# Patient Record
Sex: MALE | Race: WHITE | NOT HISPANIC OR LATINO | URBAN - METROPOLITAN AREA
[De-identification: names, ages, dates, MRNs, and addresses within clinical notes are randomized per-mention and may not be internally consistent; named-entity substitution may affect disease eponyms.]

---

## 2017-01-13 ENCOUNTER — EMERGENCY (EMERGENCY)
Facility: HOSPITAL | Age: 42
LOS: 1 days | Discharge: HOME | End: 2017-01-13
Attending: EMERGENCY MEDICINE | Admitting: EMERGENCY MEDICINE
Payer: COMMERCIAL

## 2017-01-13 VITALS
HEART RATE: 66 BPM | DIASTOLIC BLOOD PRESSURE: 72 MMHG | SYSTOLIC BLOOD PRESSURE: 114 MMHG | TEMPERATURE: 98 F | OXYGEN SATURATION: 99 % | RESPIRATION RATE: 18 BRPM

## 2017-01-13 VITALS
OXYGEN SATURATION: 97 % | HEART RATE: 72 BPM | SYSTOLIC BLOOD PRESSURE: 122 MMHG | DIASTOLIC BLOOD PRESSURE: 70 MMHG | RESPIRATION RATE: 16 BRPM

## 2017-01-13 DIAGNOSIS — R07.2 PRECORDIAL PAIN: ICD-10-CM

## 2017-01-13 DIAGNOSIS — R10.13 EPIGASTRIC PAIN: ICD-10-CM

## 2017-01-13 LAB
ALBUMIN SERPL ELPH-MCNC: 4.4 G/DL — SIGNIFICANT CHANGE UP (ref 3.3–5)
ALP SERPL-CCNC: 82 U/L — SIGNIFICANT CHANGE UP (ref 40–120)
ALT FLD-CCNC: 27 U/L RC — SIGNIFICANT CHANGE UP (ref 10–45)
ANION GAP SERPL CALC-SCNC: 15 MMOL/L — SIGNIFICANT CHANGE UP (ref 5–17)
APTT BLD: 30.8 SEC — SIGNIFICANT CHANGE UP (ref 27.5–37.4)
AST SERPL-CCNC: 31 U/L — SIGNIFICANT CHANGE UP (ref 10–40)
BASOPHILS # BLD AUTO: 0 K/UL — SIGNIFICANT CHANGE UP (ref 0–0.2)
BASOPHILS NFR BLD AUTO: 0.9 % — SIGNIFICANT CHANGE UP (ref 0–2)
BILIRUB SERPL-MCNC: 0.2 MG/DL — SIGNIFICANT CHANGE UP (ref 0.2–1.2)
BUN SERPL-MCNC: 14 MG/DL — SIGNIFICANT CHANGE UP (ref 7–23)
CALCIUM SERPL-MCNC: 9.3 MG/DL — SIGNIFICANT CHANGE UP (ref 8.4–10.5)
CHLORIDE SERPL-SCNC: 104 MMOL/L — SIGNIFICANT CHANGE UP (ref 96–108)
CO2 SERPL-SCNC: 22 MMOL/L — SIGNIFICANT CHANGE UP (ref 22–31)
CREAT SERPL-MCNC: 1.08 MG/DL — SIGNIFICANT CHANGE UP (ref 0.5–1.3)
EOSINOPHIL # BLD AUTO: 0.1 K/UL — SIGNIFICANT CHANGE UP (ref 0–0.5)
EOSINOPHIL NFR BLD AUTO: 2.3 % — SIGNIFICANT CHANGE UP (ref 0–6)
GLUCOSE SERPL-MCNC: 115 MG/DL — HIGH (ref 70–99)
HCT VFR BLD CALC: 41.6 % — SIGNIFICANT CHANGE UP (ref 39–50)
HGB BLD-MCNC: 15.3 G/DL — SIGNIFICANT CHANGE UP (ref 13–17)
INR BLD: 0.96 RATIO — SIGNIFICANT CHANGE UP (ref 0.88–1.16)
LYMPHOCYTES # BLD AUTO: 1.7 K/UL — SIGNIFICANT CHANGE UP (ref 1–3.3)
LYMPHOCYTES # BLD AUTO: 28.8 % — SIGNIFICANT CHANGE UP (ref 13–44)
MCHC RBC-ENTMCNC: 31.4 PG — SIGNIFICANT CHANGE UP (ref 27–34)
MCHC RBC-ENTMCNC: 36.7 GM/DL — HIGH (ref 32–36)
MCV RBC AUTO: 85.7 FL — SIGNIFICANT CHANGE UP (ref 80–100)
MONOCYTES # BLD AUTO: 0.4 K/UL — SIGNIFICANT CHANGE UP (ref 0–0.9)
MONOCYTES NFR BLD AUTO: 7 % — SIGNIFICANT CHANGE UP (ref 2–14)
NEUTROPHILS # BLD AUTO: 3.5 K/UL — SIGNIFICANT CHANGE UP (ref 1.8–7.4)
NEUTROPHILS NFR BLD AUTO: 61.1 % — SIGNIFICANT CHANGE UP (ref 43–77)
PLATELET # BLD AUTO: 177 K/UL — SIGNIFICANT CHANGE UP (ref 150–400)
POTASSIUM SERPL-MCNC: 4.3 MMOL/L — SIGNIFICANT CHANGE UP (ref 3.5–5.3)
POTASSIUM SERPL-SCNC: 4.3 MMOL/L — SIGNIFICANT CHANGE UP (ref 3.5–5.3)
PROT SERPL-MCNC: 6.7 G/DL — SIGNIFICANT CHANGE UP (ref 6–8.3)
PROTHROM AB SERPL-ACNC: 10.4 SEC — SIGNIFICANT CHANGE UP (ref 10–13.1)
RBC # BLD: 4.85 M/UL — SIGNIFICANT CHANGE UP (ref 4.2–5.8)
RBC # FLD: 12.4 % — SIGNIFICANT CHANGE UP (ref 10.3–14.5)
SODIUM SERPL-SCNC: 141 MMOL/L — SIGNIFICANT CHANGE UP (ref 135–145)
TROPONIN T SERPL-MCNC: <0.01 NG/ML — SIGNIFICANT CHANGE UP (ref 0–0.06)
TROPONIN T SERPL-MCNC: <0.01 NG/ML — SIGNIFICANT CHANGE UP (ref 0–0.06)
WBC # BLD: 5.8 K/UL — SIGNIFICANT CHANGE UP (ref 3.8–10.5)
WBC # FLD AUTO: 5.8 K/UL — SIGNIFICANT CHANGE UP (ref 3.8–10.5)

## 2017-01-13 PROCEDURE — 83690 ASSAY OF LIPASE: CPT

## 2017-01-13 PROCEDURE — 93010 ELECTROCARDIOGRAM REPORT: CPT | Mod: 77

## 2017-01-13 PROCEDURE — 71020: CPT | Mod: 26

## 2017-01-13 PROCEDURE — 93005 ELECTROCARDIOGRAM TRACING: CPT | Mod: 76

## 2017-01-13 PROCEDURE — 96375 TX/PRO/DX INJ NEW DRUG ADDON: CPT

## 2017-01-13 PROCEDURE — G0378: CPT

## 2017-01-13 PROCEDURE — 96374 THER/PROPH/DIAG INJ IV PUSH: CPT

## 2017-01-13 PROCEDURE — 71046 X-RAY EXAM CHEST 2 VIEWS: CPT

## 2017-01-13 PROCEDURE — 99284 EMERGENCY DEPT VISIT MOD MDM: CPT | Mod: 25

## 2017-01-13 PROCEDURE — 85610 PROTHROMBIN TIME: CPT

## 2017-01-13 PROCEDURE — 99220: CPT | Mod: 25

## 2017-01-13 PROCEDURE — 85027 COMPLETE CBC AUTOMATED: CPT

## 2017-01-13 PROCEDURE — 80053 COMPREHEN METABOLIC PANEL: CPT

## 2017-01-13 PROCEDURE — 85730 THROMBOPLASTIN TIME PARTIAL: CPT

## 2017-01-13 PROCEDURE — 93010 ELECTROCARDIOGRAM REPORT: CPT

## 2017-01-13 PROCEDURE — 84484 ASSAY OF TROPONIN QUANT: CPT

## 2017-01-13 RX ORDER — METOCLOPRAMIDE HCL 10 MG
10 TABLET ORAL ONCE
Qty: 0 | Refills: 0 | Status: COMPLETED | OUTPATIENT
Start: 2017-01-13 | End: 2017-01-13

## 2017-01-13 RX ORDER — METOCLOPRAMIDE HCL 10 MG
1 TABLET ORAL
Qty: 9 | Refills: 0
Start: 2017-01-13 | End: 2017-01-16

## 2017-01-13 RX ORDER — LIDOCAINE 4 G/100G
10 CREAM TOPICAL ONCE
Qty: 0 | Refills: 0 | Status: COMPLETED | OUTPATIENT
Start: 2017-01-13 | End: 2017-01-13

## 2017-01-13 RX ORDER — SODIUM CHLORIDE 9 MG/ML
3 INJECTION INTRAMUSCULAR; INTRAVENOUS; SUBCUTANEOUS EVERY 12 HOURS
Qty: 0 | Refills: 0 | Status: DISCONTINUED | OUTPATIENT
Start: 2017-01-13 | End: 2017-01-17

## 2017-01-13 RX ORDER — FAMOTIDINE 10 MG/ML
20 INJECTION INTRAVENOUS ONCE
Qty: 0 | Refills: 0 | Status: COMPLETED | OUTPATIENT
Start: 2017-01-13 | End: 2017-01-13

## 2017-01-13 RX ORDER — ASPIRIN/CALCIUM CARB/MAGNESIUM 324 MG
162 TABLET ORAL DAILY
Qty: 0 | Refills: 0 | Status: DISCONTINUED | OUTPATIENT
Start: 2017-01-13 | End: 2017-01-17

## 2017-01-13 RX ADMIN — SODIUM CHLORIDE 3 MILLILITER(S): 9 INJECTION INTRAMUSCULAR; INTRAVENOUS; SUBCUTANEOUS at 17:35

## 2017-01-13 RX ADMIN — Medication 162 MILLIGRAM(S): at 13:58

## 2017-01-13 RX ADMIN — Medication 30 MILLILITER(S): at 15:42

## 2017-01-13 RX ADMIN — Medication 10 MILLIGRAM(S): at 16:02

## 2017-01-13 RX ADMIN — LIDOCAINE 10 MILLILITER(S): 4 CREAM TOPICAL at 15:42

## 2017-01-13 RX ADMIN — FAMOTIDINE 20 MILLIGRAM(S): 10 INJECTION INTRAVENOUS at 15:42

## 2017-01-13 NOTE — ED ADULT NURSE NOTE - OBJECTIVE STATEMENT
41 yr old male, a+ox3, presents via ambulance from doctors office for chest pressure, pain, and persistent hiccups, and increased belching.  Reports hiccups started yesterday.  Presents with stable vital signs, and states that chest pressure started last night.  Denies radiation, reports sternal pressure.  Denies nausea/vomiting and diarrhea s/p starting his GERD medications.  Denies shortness of breath, or dyspnea, fevers, chills. Denies abd pain or distention.  Lungs cta unlabored.  skin w/d/i.  Presents with 20g IVL to left AC.

## 2017-01-13 NOTE — ED CDU PROVIDER NOTE - MEDICAL DECISION MAKING DETAILS
Molly: 41 year old male with 1 day history of hiccups, cp with radiation to left arm. HEART score of= 0.  will c/w tele monitoring, serial cardiac enzymes, reassess.

## 2017-01-13 NOTE — ED PROVIDER NOTE - MEDICAL DECISION MAKING DETAILS
40 yo male with chest pain, low risk HEART score of 1; will obtain basic labs ekg troponin x 2 chest xray --> re eval

## 2017-01-13 NOTE — ED PROVIDER NOTE - OBJECTIVE STATEMENT
42 yo male with gerd presenting with 1 day hx of hiccups and chest pain.  chest pain described as sharp tearing pain located substernal/epigastric with radiation to lateral left chest.  started this morning and lasted about two hours.  went to his primary care doctor where he had episode of pressure like chest pain which felt like there was a rock on his chest with no radiation.  no shortness of breath or diaphoresis.  ekg done by ems was unremarkable.  nothing makes the pain better or worse.  described as 9/10 in severity.  no fevers, chills, n/v/d.  also has been having hiccups since last night which has resolved prior to presentation in the ER.    PCP- Chilo Motta

## 2017-01-13 NOTE — ED CDU PROVIDER NOTE - PLAN OF CARE
1. You may take Motrin 600mg every 6 hours as needed for pain.  2. Follow up with your Primary Care Physician as soon as possible for further evaluation.   3. Return to the Emergency Department for any concerning symptoms. 1. You may take Motrin 600mg every 6 hours as needed for pain. For recurrent hiccups you may take Reglan 10mg every 8 hours as needed.   2. Follow up with your Primary Care Physician and the [facility] as soon as possible for further evaluation (phone number provided).   3. Return to the Emergency Department for any concerning symptoms. 1. You may take Motrin 600mg every 6 hours as needed for pain. For recurrent hiccups you may take Reglan 10mg every 8 hours as needed and Pepcid 20mg every 12 hours as needed.    2. Follow up with your Primary Care Physician and the [facility] as soon as possible for further evaluation (phone number provided).   3. Return to the Emergency Department for any concerning symptoms.

## 2017-01-13 NOTE — ED CDU PROVIDER NOTE - DETAILS
Chest Pain  -Frequent reevaluations  -Tele monitoring  -repeat troponin w/ EKG  -Case d/w Dr. Barnes

## 2017-01-13 NOTE — ED CDU PROVIDER NOTE - ATTENDING CONTRIBUTION TO CARE
I have personally performed a face to face diagnostic evaluation on this patient.  I have reviewed the ACP note and agree with the history, exam, and plan of care, except as noted.  History and Exam by me shows

## 2017-01-13 NOTE — ED PROVIDER NOTE - ATTENDING CONTRIBUTION TO CARE
I have seen and evaluated this patient with the resident.   I agree with the findings  unless other wise stated.  After my face to face bedside evaluation, I am [age] year old male with chest pressure x 1 day. PE: att exam: patient awake alert NAD . LUNGS CTAB no wheeze no crackle. CARD RRR no m/r/g.  Abdomen soft NT ND no rebound no guarding no CVA tenderness. EXT WWP no edema no calf tenderness CV 2+DP/PT bilaterally. neuro A&Ox3 gait normal.  skin warm and dry no rash

## 2017-01-13 NOTE — ED ADULT NURSE REASSESSMENT NOTE - NS ED NURSE REASSESS COMMENT FT1
Pt d/c home per ISIDRO Ohara, pt VSS no complaints no distress, IV lock removed, ambulated out of unit with spouse, left hospital via private car.
report to CDU Rn
Pt. started having  intense hiccups. ISIDRO Ohara notified with orders to administer GI cocktail. Orders carried out.
Received pt. from ED nurse REVA García. VSS. Placed on tele as ordered. NSR on tele monitor. No acute distress noted. No complaints voiced. Pt. oriented to CDU. IV access patent. Awaiting 2nd trop @1807. Educated about fall precautions. Safety maintained. Needs attended to. Will continue to monitor.

## 2017-01-13 NOTE — ED CDU PROVIDER NOTE - OBJECTIVE STATEMENT
40 yo male with gerd presenting with 1 day hx of hiccups and chest pain.  chest pain described as sharp tearing pain located substernal/epigastric with radiation to lateral left chest.  started this morning and lasted about two hours.  went to his primary care doctor where he had episode of pressure like chest pain which felt like there was a rock on his chest with no radiation.  no shortness of breath or diaphoresis.  ekg done by ems was unremarkable.  nothing makes the pain better or worse.  described as 9/10 in severity.  no fevers, chills, n/v/d.  also has been having hiccups since last night which has resolved prior to presentation in the ER.    PCP- Chilo Motta

## 2017-01-13 NOTE — ED CDU PROVIDER NOTE - PROGRESS NOTE DETAILS
Patient resting in bed, NAD, VSS, reporting sudden onset hiccuping with significant associated pain with each hiccup, no pain in between. He does report a significant history of GI problems since the age of 9, has had several upper endoscopies in the past. Will give GI cocktail and Reglan and reassess. Lev Marquis PA-C. Patient resting comfortably in bed, NAD, VSS, no longer hiccuping. Will continue to observe and repeat bloodwork at 1800. Lev Marquis PA-C. Arron: I Richard Heller MD have personally performed a face to face diagnostic evaluation on this patient.  I have reviewed the ACP note and agree with the history, exam, and plan of care, except as noted.   My medical decision making and observations are found below.  Patient with persistent hiccups.  Long hx of GI issues. Last EGD 2 years ago.  GI cocktail and reglan stopped hiccups will need GI referral eventually as outpatient. I have personally performed a face to face diagnostic evaluation on this patient.  I have reviewed the ACP note and agree with the history, exam, and plan of care, except as noted.  History and Exam by me shows  Attn - hiccups resolved pt without pain.  Trop neg x 2  hx of reflux  P/E - nad, skin-, lungs-, cor-, abdo-, Pt advised to F/U with GI - Pt stable for D/C.

## 2017-01-13 NOTE — ED PROVIDER NOTE - ENMT, MLM
Airway patent, Nasal mucosa clear. Mouth with normal mucosa. Throat has no vesicles, no oropharyngeal exudates and uvula is midline. Airway patent. Mouth with normal mucosa. Throat has no vesicles, no oropharyngeal exudates and uvula is midline.

## 2017-01-22 ENCOUNTER — EMERGENCY (EMERGENCY)
Facility: HOSPITAL | Age: 42
LOS: 1 days | Discharge: HOME | End: 2017-01-22
Admitting: EMERGENCY MEDICINE

## 2017-01-22 VITALS
OXYGEN SATURATION: 96 % | DIASTOLIC BLOOD PRESSURE: 56 MMHG | RESPIRATION RATE: 16 BRPM | WEIGHT: 184.97 LBS | HEIGHT: 65 IN | SYSTOLIC BLOOD PRESSURE: 130 MMHG | HEART RATE: 71 BPM | TEMPERATURE: 98 F

## 2017-01-22 DIAGNOSIS — R07.81 PLEURODYNIA: ICD-10-CM

## 2017-03-16 ENCOUNTER — HOSPITAL ENCOUNTER (EMERGENCY)
Dept: HOSPITAL 74 - JERFT | Age: 42
Discharge: HOME | End: 2017-03-16
Payer: COMMERCIAL

## 2017-03-16 VITALS — SYSTOLIC BLOOD PRESSURE: 124 MMHG | TEMPERATURE: 98 F | DIASTOLIC BLOOD PRESSURE: 97 MMHG | HEART RATE: 68 BPM

## 2017-03-16 VITALS — BODY MASS INDEX: 29 KG/M2
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
## 2017-03-16 DIAGNOSIS — S61.011A: Primary | ICD-10-CM
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
## 2017-03-16 PROCEDURE — 0HQFXZZ REPAIR RIGHT HAND SKIN, EXTERNAL APPROACH: ICD-10-PCS

## 2017-03-16 NOTE — PDOC
[id number]


RT THUMB LACERATION


Time Seen by Provider: 03/16/17 13:24


History Source: Patient


Exam Limitations: No Limitations





- History of Present Illness


Initial Comments: 


03/16/17 14:26








Chief complaint right thumb laceration








History of present illness: Patient is a 41-year-old male here today with a 

laceration to his right thumb that he sustained on a razor while working in his 

home.  Patient has full range of motion of right thumb at MCP and PIP and DIP 

joint. Pt. denies any numbness of right thumb or hand. She was up-to-date with 

tetanus.





03/16/17 14:28





Occurred: reports: just prior to arrival


Severity: reports: mild


Pain Location: reports: upper extremity (rt. thumb laceration)


Method of Injury: Yes: other (on a razor)


Modifying Factors: improves with: None


Loss of Consciousness: no loss of consciousness


Associated Symptoms (Fall): denies symptoms





Past History





- Past Medical History


Allergies/Adverse Reactions: 


 Allergies











Allergy/AdvReac Type Severity Reaction Status Date / Time


 


No Known Allergies Allergy   Verified 03/16/17 12:38











Home Medications: 


Ambulatory Orders





Cephalexin Monohydrate [Keflex -] 500 mg PO Q8H #20 capsule 03/16/17 








Other medical history: denies





- Psycho/Social/Smoking Cessation Hx


Suicidal Ideation: No


Smoking History: Never smoked


Information on smoking cessation initiated: No


Hx Alcohol Use: No


Drug/Substance Use Hx: No


Substance Use Type: Marijuana





**Review of Systems





- Review of Systems


Able to Perform ROS?: Yes


Constitutional: No: Symptoms Reported


HEENTM: No: Symptoms Reported


Respiratory: No: Symptoms reported


Cardiac (ROS): No: Symptoms Reported


ABD/GI: No: Symptoms Reported


: No: Symptoms Reported


Musculoskeletal: No: Symptoms Reported


Integumentary: Yes: Other (rt. dorsal thumb laceration proximal phalange)


Neurological: No: Symptoms reported





*Physical Exam





- Vital Signs


 Last Vital Signs











Temp Pulse Resp BP Pulse Ox


 


 98 F   68   18   124/97   98 


 


 03/16/17 12:35  03/16/17 12:35  03/16/17 12:35  03/16/17 12:35  03/16/17 12:35














- Physical Exam


General Appearance: Yes: Appropriately Dressed


Comments:: 





03/16/17 14:01


right radial pulse 4 + 


Extremity: positive: Normal Capillary Refill, Normal Range of Motion (rt. thumb 

at MCP jt, PIP JT, DIP jt. ), Tender (rt. thumb )


Integumentary: positive: Other (laceration rt. dorsal proximal thumb approx 2.6 

cm x 0.25 cm  linear superficial )


Neurologic: positive: Normal Response (rt. thumb ), Respond to painful stimul (

rt. thumb ).  negative: Sensory Deficit





Procedures





- Consent


Consent obtained: From Patient





- Laceration/Wound Repair


  ** Right Dorsal 1st digit Finger


Wound Length: 2.6 to 5.0 cm


Wound Explored: clean


Wound's Depth, Shape: superficial, linear


Irrigated w/ Saline: Yes


Betadine Prep: Yes


Anesthesia: 1% Lidocaine


Amount of Anesthetic (ccs): 2


Wound Repaired With: Sutures


Suture Size/Type: 4:0


Number of Sutures: 5 (interrupted)


Sterile Dressing Applied: Yes


Splint Applied: No


Sling Applied: No


Progress: 





03/16/17 14:09








Cleanse with Betadine and then Irrigated thoroughly with normal saline 0.9 % no 

foreign body noted, no tendon involvement patient has full range of motion of 

her right MCP joint, PIP joint and DIP joint. 





Medical Decision Making





- Medical Decision Making





03/16/17 14:28


Patient is a 41-year-old male here today with a laceration to his right thumb 

that he sustained on a razor while working in his home.  Patient has full range 

of motion of right thumb at MCP and PIP and DIP joint. Pt. denies any numbness 

of right thumb or hand. She was up-to-date with tetanus.











Right thumb laceration 











PLAN: 








Five  interrupted sutures placed and right dorsal thumb laceration





Dry sterile dressing applied


Keflex 500 mg by mouth given and then 1 tab every 8 hours 7 days


Patient lives in [facility] will have sutures removed there in 10-

14 days





*DC/Admit/Observation/Transfer


Diagnosis at time of Disposition: 


Laceration of thumb


Qualifiers:


 Encounter type: initial encounter Laterality: right Qualified Code(s): 

S61.011A - Laceration without foreign body of right thumb without damage to nail

, initial encounter





- Discharge Dispostion


Disposition: HOME


Condition at time of disposition: Stable





- Prescriptions


Prescriptions: 


Cephalexin Monohydrate [Keflex -] 500 mg PO Q8H #20 capsule





- Patient Instructions


Additional Instructions: 














May wash wound twice daily with antibacterial soap and water pat dry well and 

apply a tiny amount of bacitracin cover with dressing went out of home let air 

out at night











Follow up forced suture removal in 10-14 days or sooner if any redness around 

wound or discharge from wound














Patient voiced understanding of discharge instructions and all questions were 

answered

## 2017-09-14 ENCOUNTER — HOSPITAL ENCOUNTER (EMERGENCY)
Dept: HOSPITAL 74 - JERFT | Age: 42
Discharge: HOME | End: 2017-09-14
Payer: COMMERCIAL

## 2017-09-14 VITALS — TEMPERATURE: 98.2 F | SYSTOLIC BLOOD PRESSURE: 120 MMHG | DIASTOLIC BLOOD PRESSURE: 78 MMHG | HEART RATE: 65 BPM

## 2017-09-14 VITALS — BODY MASS INDEX: 30.9 KG/M2

## 2017-09-14 DIAGNOSIS — S46.812A: Primary | ICD-10-CM
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
## 2017-09-14 NOTE — PDOC
History of Present Illness





- General


Chief Complaint: Pain, Acute


Stated Complaint: LT SHOULDER/ ARM PAIN


Time Seen by Provider: 09/14/17 11:58


History Source: Patient


Exam Limitations: No Limitations





- History of Present Illness


Initial Comments: 





09/14/17 12:14


42 yr male with c/o pain to left shoulder after fall off mower machine yesterday

, pt was mowing the lawn on a stand up mower when he hit a pothole and fell. pt 

denies head trauma no LOC. 


Occurred: reports: yesterday


Severity: reports: moderate


Upper Extremity Pain Location: left: shoulder (pt right hand dominant )


Method of Injury: reports: fell


Modifying Factors: improves with: None





Past History





- Past Medical History


Allergies/Adverse Reactions: 


 Allergies











Allergy/AdvReac Type Severity Reaction Status Date / Time


 


No Known Allergies Allergy   Verified 09/14/17 11:04











Home Medications: 


Ambulatory Orders





Naproxen [Naprosyn -] 500 mg PO BID PRN #20 tablet 09/14/17 








GI Disorders: Yes (GERD)


Psychiatric Problems: Yes (ANXIETY)





- Surgical History


Neurologic Surgery: Yes ("SPINAL CYST REMOVAL")





- Immunization History


Immunization Up to Date: Yes





- Psycho/Social/Smoking Cessation Hx


Suicidal Ideation: No


Smoking History: Never smoked


Have you smoked in the past 12 months: No


Information on smoking cessation initiated: No


Hx Alcohol Use: Yes (ALCOHOL)


Drug/Substance Use Hx: No


Substance Use Type: Alcohol, Marijuana





*Physical Exam





- Vital Signs


 Last Vital Signs











Temp Pulse Resp BP Pulse Ox


 


 98.2 F   65   16   120/78   98 


 


 09/14/17 11:04  09/14/17 11:04  09/14/17 11:04  09/14/17 11:04  09/14/17 11:04














- Physical Exam


General Appearance: Yes: Nourished, Appropriately Dressed


HEENT: positive: EOMI, KHADRA, TMs Normal, Pharynx Normal


Neck: positive: Supple.  negative: Tender


Respiratory/Chest: positive: Lungs Clear, Normal Breath Sounds


Cardiovascular: positive: Regular Rhythm, Regular Rate


Musculoskeletal: positive: Normal Inspection


Extremity: positive: Normal Capillary Refill, Normal Inspection, Normal Range 

of Motion, Tender (anterior and posterior shoulder left side , nv intact 

limited ROM due to pain )


Integumentary: positive: Normal Color, Dry, Warm


Neurologic: positive: CNs II-XII NML intact, Fully Oriented, Alert, Normal Mood/

Affect, Normal Response, Motor Strength 5/5





ED Treatment Course





- RADIOLOGY


Radiology Studies Ordered: 














 Category Date Time Status


 


 SHOULDER-LEFT [RAD] Stat Radiology  09/14/17 12:10 Ordered














Medical Decision Making





- Medical Decision Making


09/14/17 12:15


cc: fell yesterday off  injured his left shoulder


no head trauma 


will get xray to r/o fracture


pt took 800motrin PTA





09/14/17 12:59


sling placed to left arm 


dc home with follow up with orthopedist tomorrow in [place] (pt has apt) 





09/14/17 13:03








*DC/Admit/Observation/Transfer


Diagnosis at time of Disposition: 


Left shoulder strain


Qualifiers:


 Encounter type: initial encounter Qualified Code(s): S46.912A - Strain of 

unspecified muscle, fascia and tendon at shoulder and upper arm level, left arm

, initial encounter





- Discharge Dispostion


Disposition: HOME


Condition at time of disposition: Improved





- Prescriptions


Prescriptions: 


Naproxen [Naprosyn -] 500 mg PO BID PRN #20 tablet


 PRN Reason: Pain





- Referrals


Referrals: 


STAFF,NOT ON [Primary Care Provider] - 


Cem Cabrera MD [Staff Physician] - 





- Patient Instructions


Additional Instructions: 


use sling while awake remove to sleep, drive and bathe


take naprosyn for pain every 12hrd as directed





follow with your orthopedist tomorrow as scheduled or with  in [place]

## 2019-05-15 PROBLEM — K21.9 GASTRO-ESOPHAGEAL REFLUX DISEASE WITHOUT ESOPHAGITIS: Chronic | Status: ACTIVE | Noted: 2017-01-13

## 2019-05-24 ENCOUNTER — APPOINTMENT (OUTPATIENT)
Dept: CARDIOLOGY | Facility: CLINIC | Age: 44
End: 2019-05-24

## 2021-05-10 NOTE — ED ADULT NURSE NOTE - NS ED NOTE ABUSE RESPONSE YN
Wt Readings from Last 3 Encounters:   05/10/21 95 7 kg (211 lb)   03/11/21 99 3 kg (219 lb)   03/09/21 100 kg (221 lb)      he follows up with Cardiology Dr Mayela Bae   he is currently taking 10 mg of torsemide every day   he has increased it to 20 after his recent ER visit in [place] however that caused an increase in the of his creatinine he had a follow-up appointment with Cardiology that lower the dose back to 10   he is advised to take an extra tablet of 10 mg if he notices an increase of 5 lb of weight or excessive bilateral lower extremity edema no

## 2022-02-23 ENCOUNTER — RESULT REVIEW (OUTPATIENT)
Age: 47
End: 2022-02-23

## 2022-02-23 ENCOUNTER — OUTPATIENT (OUTPATIENT)
Dept: OUTPATIENT SERVICES | Facility: HOSPITAL | Age: 47
LOS: 1 days | End: 2022-02-23
Payer: COMMERCIAL

## 2022-02-23 VITALS
RESPIRATION RATE: 17 BRPM | SYSTOLIC BLOOD PRESSURE: 112 MMHG | DIASTOLIC BLOOD PRESSURE: 67 MMHG | OXYGEN SATURATION: 95 % | HEART RATE: 86 BPM

## 2022-02-23 VITALS
HEIGHT: 66 IN | DIASTOLIC BLOOD PRESSURE: 90 MMHG | OXYGEN SATURATION: 97 % | WEIGHT: 197.98 LBS | HEART RATE: 72 BPM | TEMPERATURE: 97 F | SYSTOLIC BLOOD PRESSURE: 114 MMHG | RESPIRATION RATE: 18 BRPM

## 2022-02-23 DIAGNOSIS — Z98.890 OTHER SPECIFIED POSTPROCEDURAL STATES: Chronic | ICD-10-CM

## 2022-02-23 DIAGNOSIS — R91.8 OTHER NONSPECIFIC ABNORMAL FINDING OF LUNG FIELD: ICD-10-CM

## 2022-02-23 PROCEDURE — 88312 SPECIAL STAINS GROUP 1: CPT

## 2022-02-23 PROCEDURE — 31624 DX BRONCHOSCOPE/LAVAGE: CPT | Mod: RT,XS

## 2022-02-23 PROCEDURE — 71045 X-RAY EXAM CHEST 1 VIEW: CPT | Mod: 26

## 2022-02-23 PROCEDURE — 31628 BRONCHOSCOPY/LUNG BX EACH: CPT | Mod: RT

## 2022-02-23 PROCEDURE — 88173 CYTOPATH EVAL FNA REPORT: CPT

## 2022-02-23 PROCEDURE — 88172 CYTP DX EVAL FNA 1ST EA SITE: CPT

## 2022-02-23 PROCEDURE — 88305 TISSUE EXAM BY PATHOLOGIST: CPT | Mod: 26

## 2022-02-23 PROCEDURE — 76000 FLUOROSCOPY <1 HR PHYS/QHP: CPT

## 2022-02-23 PROCEDURE — 88312 SPECIAL STAINS GROUP 1: CPT | Mod: 26,59

## 2022-02-23 PROCEDURE — 88312 SPECIAL STAINS GROUP 1: CPT | Mod: 26

## 2022-02-23 PROCEDURE — 88305 TISSUE EXAM BY PATHOLOGIST: CPT

## 2022-02-23 PROCEDURE — 71045 X-RAY EXAM CHEST 1 VIEW: CPT

## 2022-02-23 PROCEDURE — 88173 CYTOPATH EVAL FNA REPORT: CPT | Mod: 26

## 2022-02-23 PROCEDURE — 31654 BRONCH EBUS IVNTJ PERPH LES: CPT | Mod: RT,XS

## 2022-02-23 DEVICE — PACK THORACENTESIS CHG: Type: IMPLANTABLE DEVICE | Status: FUNCTIONAL

## 2022-02-23 RX ORDER — SODIUM CHLORIDE 9 MG/ML
1000 INJECTION, SOLUTION INTRAVENOUS
Refills: 0 | Status: DISCONTINUED | OUTPATIENT
Start: 2022-02-23 | End: 2022-03-09

## 2022-02-23 RX ORDER — IBUPROFEN 200 MG
1 TABLET ORAL
Qty: 0 | Refills: 0 | DISCHARGE

## 2022-02-23 RX ORDER — ALPRAZOLAM 0.25 MG
1 TABLET ORAL
Qty: 0 | Refills: 0 | DISCHARGE

## 2022-02-23 RX ORDER — ROSUVASTATIN CALCIUM 5 MG/1
1 TABLET ORAL
Qty: 0 | Refills: 0 | DISCHARGE

## 2022-02-23 RX ORDER — PREDNISOLONE 5 MG
5 TABLET ORAL
Qty: 0 | Refills: 0 | DISCHARGE

## 2022-02-23 NOTE — ASU PATIENT PROFILE, ADULT - FALL HARM RISK - UNIVERSAL INTERVENTIONS
Bed in lowest position, wheels locked, appropriate side rails in place/Call bell, personal items and telephone in reach/Instruct patient to call for assistance before getting out of bed or chair/Non-slip footwear when patient is out of bed/[place] to call system/Physically safe environment - no spills, clutter or unnecessary equipment/Purposeful Proactive Rounding/Room/bathroom lighting operational, light cord in reach

## 2022-02-23 NOTE — PRE-ANESTHESIA EVALUATION ADULT - NSANTHOSAYNRD_GEN_A_CORE
No. MATT screening performed.  STOP BANG Legend: 0-2 = LOW Risk; 3-4 = INTERMEDIATE Risk; 5-8 = HIGH Risk

## 2022-02-23 NOTE — ASU PREOP CHECKLIST - SITE MARKED BY ANESTHESIOLOGIST
Assessment charted. Pt in bed resting after going to bathroom,  appears short
of breath and weak from effort. States he did not rest well last night,
frequent disruptions and headaches. Pain is 6/10 to abscess sites, scheduled
and PRN meds given. INT to LH. Lungs are coarse in bases and wheezy expirtory.
 Will conitnue to monitor. n/a

## 2022-02-23 NOTE — PRE-ANESTHESIA EVALUATION ADULT - NSANTHPMHFT_GEN_ALL_CORE
45 yo M w/ PMHx of HLD, GERD presents for bronchoscopy and EBUS 47 yo M w/ PMHx of HLD, GERD w/ pulmonary nodules presents for bronchoscopy and EBUS to r/o sarcoidosis.    Denies active CP/SOB/Orthopnea  Denies hx of MI/CHF

## 2022-02-23 NOTE — ASU PATIENT PROFILE, ADULT - NSICDXPASTMEDICALHX_GEN_ALL_CORE_FT
PAST MEDICAL HISTORY:  Gastroesophageal reflux disease, esophagitis presence not specified     Hyperlipidemia

## 2022-02-23 NOTE — ASU DISCHARGE PLAN (ADULT/PEDIATRIC) - CARE PROVIDER_API CALL
Jerrod Ford (DO)  Critical Care Medicine; Internal Medicine; Pulmonary Disease  [address] [place] [address], [facility] [address]  [place] [address]  Phone: [phone]  Fax: [phone]  Follow Up Time:

## 2022-02-23 NOTE — ASU DISCHARGE PLAN (ADULT/PEDIATRIC) - NS MD DC FALL RISK RISK
For information on Fall & Injury Prevention, visit: [email or web address] OR  [email or web address] OR  https://www.cdc.gov/steadi/patient.html

## 2022-02-24 LAB — NON-GYNECOLOGICAL CYTOLOGY STUDY: SIGNIFICANT CHANGE UP

## 2022-02-25 LAB — SURGICAL PATHOLOGY STUDY: SIGNIFICANT CHANGE UP

## 2022-09-03 NOTE — ED ADULT TRIAGE NOTE - BSA (M2)
1.91

## 2024-09-24 ENCOUNTER — NON-APPOINTMENT (OUTPATIENT)
Age: 49
End: 2024-09-24

## (undated) DEVICE — FILTERLINE NASAL O2 CO2 ADLT

## (undated) DEVICE — DRSG CURITY GAUZE SPONGE 4 X 4" 12-PLY

## (undated) DEVICE — BRUSH CYTO ENDO

## (undated) DEVICE — Device

## (undated) DEVICE — FOLEY HOLDER STATLOCK 2 WAY ADULT

## (undated) DEVICE — TUBING SUCTION 20FT

## (undated) DEVICE — SOL INJ NS 0.9% 500ML 1-PORT

## (undated) DEVICE — TUBING SUCTION CONN 6FT STERILE

## (undated) DEVICE — SYR LUER LOK 10CC

## (undated) DEVICE — VALVE SUCTION EVIS 160/200/240

## (undated) DEVICE — MASK O2 NON REBREATH 3IN1 ADULT

## (undated) DEVICE — NDL HYPO SAFE 18G X 1.5" (PINK)

## (undated) DEVICE — SYR LUER LOK 3CC

## (undated) DEVICE — SUCTION YANKAUER NO CONTROL VENT

## (undated) DEVICE — FORCEP RADIAL JAW 4 PEDIATRIC W NDL 1.8MM 2MM 160CM DISP

## (undated) DEVICE — GOWN TRIMAX LG

## (undated) DEVICE — SUCTION CATH ARGYLE WHISTLE TIP 14FR STRAIGHT PACKED

## (undated) DEVICE — ELCTR GROUNDING PAD ADULT COVIDIEN

## (undated) DEVICE — SOL INJ NS 0.9% 250ML

## (undated) DEVICE — DRAIN PLEUROVAC

## (undated) DEVICE — VALVE BIOPSY BRONCHOVIDEOSCOPE

## (undated) DEVICE — NDL ASPIRATION 21G

## (undated) DEVICE — FILTERLINE ET TUBE PED/ADLT ETCO2

## (undated) DEVICE — NDL ASPIRATION 22G W SYR

## (undated) DEVICE — FORCEP BIOPSY OVAL CUP DISP

## (undated) DEVICE — SYR LUER LOK 50CC

## (undated) DEVICE — BALLOON SINGLE FOR BF-UC160F

## (undated) DEVICE — TRAP SPECIMEN SPUTUM 40CC